# Patient Record
Sex: MALE
[De-identification: names, ages, dates, MRNs, and addresses within clinical notes are randomized per-mention and may not be internally consistent; named-entity substitution may affect disease eponyms.]

---

## 2019-10-10 PROBLEM — Z00.00 ENCOUNTER FOR PREVENTIVE HEALTH EXAMINATION: Status: ACTIVE | Noted: 2019-10-10

## 2019-10-14 ENCOUNTER — APPOINTMENT (OUTPATIENT)
Dept: OTOLARYNGOLOGY | Facility: CLINIC | Age: 49
End: 2019-10-14
Payer: MEDICAID

## 2019-10-14 VITALS
HEART RATE: 72 BPM | SYSTOLIC BLOOD PRESSURE: 134 MMHG | BODY MASS INDEX: 33.13 KG/M2 | HEIGHT: 73 IN | DIASTOLIC BLOOD PRESSURE: 87 MMHG | WEIGHT: 250 LBS | TEMPERATURE: 98.1 F | OXYGEN SATURATION: 97 %

## 2019-10-14 DIAGNOSIS — H81.11 BENIGN PAROXYSMAL VERTIGO, RIGHT EAR: ICD-10-CM

## 2019-10-14 DIAGNOSIS — Z78.9 OTHER SPECIFIED HEALTH STATUS: ICD-10-CM

## 2019-10-14 DIAGNOSIS — H61.23 IMPACTED CERUMEN, BILATERAL: ICD-10-CM

## 2019-10-14 DIAGNOSIS — H93.A9 PULSATILE TINNITUS, UNSPECIFIED EAR: ICD-10-CM

## 2019-10-14 PROCEDURE — 69210 REMOVE IMPACTED EAR WAX UNI: CPT

## 2019-10-14 PROCEDURE — 92557 COMPREHENSIVE HEARING TEST: CPT

## 2019-10-14 PROCEDURE — 95992 CANALITH REPOSITIONING PROC: CPT

## 2019-10-14 PROCEDURE — 99204 OFFICE O/P NEW MOD 45 MIN: CPT | Mod: 25

## 2019-10-14 PROCEDURE — 92550 TYMPANOMETRY & REFLEX THRESH: CPT

## 2019-10-14 RX ORDER — MECLIZINE HYDROCHLORIDE 25 MG/1
25 TABLET ORAL
Refills: 0 | Status: ACTIVE | COMMUNITY

## 2019-10-14 RX ORDER — PSEUDOEPHEDRINE HCL 30 MG
TABLET ORAL
Refills: 0 | Status: ACTIVE | COMMUNITY

## 2019-10-14 NOTE — ASSESSMENT
[FreeTextEntry1] : Discussed post-epley maneuver care and provided the corresponding handout. Recheck 1-2 weeks; explained that the pulsatile tinnitus will require further investigation if it persists. Avoid qtips.

## 2019-10-14 NOTE — HISTORY OF PRESENT ILLNESS
[de-identified] : One month ago, at the tail end of an acute URI, had the onset of episodes lasting seconds of spinning provoked by looking up though this has happened while sitting still as well. Happens 5-6 times/day. \par For the same time period has had a sense of clogged hearing in the ear with a pulsating noise in the R ear once/day that lasts 2-3 min.

## 2019-10-14 NOTE — PHYSICAL EXAM
[Binocular Microscopic Exam] : Binocular microscopic exam was performed [Normal] : no rashes [de-identified] : negative Ish Hallpike for post & lat canals bilaterally w/ Frenzel goggles, though with prominent c/o spinning w/ testing the R post canal; negative fistula test AU; EOMI/PERRL w/ no resting nystagmus; CN 2-12 intact; good smooth pursuit; no PHSN and normal Hamalgyi head thrust; negative enhanced Rhomberg; unremarkable gait.\par  An Epley maneuver was then performed x3 with relief on the third pass. [FreeTextEntry9] : cerumen impaction removed with a hook [FreeTextEntry8] : cerumen impaction removed with a hook

## 2019-12-02 ENCOUNTER — APPOINTMENT (OUTPATIENT)
Dept: OTOLARYNGOLOGY | Facility: CLINIC | Age: 49
End: 2019-12-02